# Patient Record
Sex: MALE | Race: WHITE | URBAN - METROPOLITAN AREA
[De-identification: names, ages, dates, MRNs, and addresses within clinical notes are randomized per-mention and may not be internally consistent; named-entity substitution may affect disease eponyms.]

---

## 2017-08-21 ENCOUNTER — OFFICE VISIT (OUTPATIENT)
Dept: ELECTROPHYSIOLOGY | Facility: HOSPITAL | Age: 57
End: 2017-08-21
Attending: ORTHOPAEDIC SURGERY
Payer: COMMERCIAL

## 2017-08-21 DIAGNOSIS — G56.00 CARPAL TUNNEL SYNDROME: ICD-10-CM

## 2017-08-21 DIAGNOSIS — G56.01 CARPAL TUNNEL SYNDROME OF RIGHT WRIST: ICD-10-CM

## 2017-08-21 PROCEDURE — 95911 NRV CNDJ TEST 9-10 STUDIES: CPT | Performed by: OTHER

## 2017-08-21 PROCEDURE — 95886 MUSC TEST DONE W/N TEST COMP: CPT | Performed by: OTHER

## 2017-08-21 NOTE — PROCEDURES
[address] [place] [address]  [place], [address] [facility]  [phone]            Patient: Rosetta Garcia Sex: Male  Patient ID: [id number] YOB: 1960      Visit Date: 8/21/2017 08:54  Patient Age On Visit Date: 61 Y Wrist ADM 3.02 ?3.60 9.9 ?5.0 100  98.4 ?50.00 Wrist - ADM 7        B. Elbow ADM 5.89  8.7  88.5 ?70 100  B. Elbow - Wrist 17 59 ?49      A. Elbow ADM 8.96  8.4  96.3 ?115 100  A. Elbow - B. 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 carpal tunnel syndrome), which were mild to moderate on the R and mild on the L.  There was no electrodiagnositic evidence of a RUE cervical radiculopathy at th

## 2017-08-23 PROBLEM — G56.00 CARPAL TUNNEL SYNDROME: Status: ACTIVE | Noted: 2017-08-21

## 2017-10-25 ENCOUNTER — TELEPHONE (OUTPATIENT)
Dept: SURGERY | Facility: CLINIC | Age: 57
End: 2017-10-25

## 2019-08-30 ENCOUNTER — HOSPITAL (OUTPATIENT)
Dept: OTHER | Age: 59
End: 2019-08-30

## 2019-12-14 ENCOUNTER — HOSPITAL ENCOUNTER (OUTPATIENT)
Dept: CT IMAGING | Age: 59
Discharge: HOME | End: 2019-12-14
Attending: ALLERGY & IMMUNOLOGY
Payer: COMMERCIAL

## 2019-12-14 ENCOUNTER — HOSPITAL ENCOUNTER (OUTPATIENT)
Dept: CT IMAGING | Age: 59
End: 2019-12-14
Payer: COMMERCIAL

## 2019-12-14 DIAGNOSIS — J45.909 ASTHMA: ICD-10-CM

## 2019-12-14 DIAGNOSIS — J40 BRONCHITIS: ICD-10-CM

## 2019-12-14 PROCEDURE — 71250 CT THORAX DX C-: CPT | Performed by: ALLERGY & IMMUNOLOGY
